# Patient Record
Sex: MALE | Race: WHITE | NOT HISPANIC OR LATINO | URBAN - METROPOLITAN AREA
[De-identification: names, ages, dates, MRNs, and addresses within clinical notes are randomized per-mention and may not be internally consistent; named-entity substitution may affect disease eponyms.]

---

## 2019-05-23 ENCOUNTER — OUTPATIENT (OUTPATIENT)
Dept: OUTPATIENT SERVICES | Facility: HOSPITAL | Age: 34
LOS: 1 days | End: 2019-05-23

## 2019-05-23 VITALS
HEIGHT: 65.75 IN | WEIGHT: 194.01 LBS | DIASTOLIC BLOOD PRESSURE: 80 MMHG | SYSTOLIC BLOOD PRESSURE: 116 MMHG | HEART RATE: 76 BPM | RESPIRATION RATE: 16 BRPM | TEMPERATURE: 98 F

## 2019-05-23 DIAGNOSIS — D17.22 BENIGN LIPOMATOUS NEOPLASM OF SKIN AND SUBCUTANEOUS TISSUE OF LEFT ARM: ICD-10-CM

## 2019-05-23 DIAGNOSIS — D17.9 BENIGN LIPOMATOUS NEOPLASM, UNSPECIFIED: ICD-10-CM

## 2019-05-23 LAB
HCT VFR BLD CALC: 48.7 % — SIGNIFICANT CHANGE UP (ref 39–50)
HGB BLD-MCNC: 16.6 G/DL — SIGNIFICANT CHANGE UP (ref 13–17)
MCHC RBC-ENTMCNC: 27.7 PG — SIGNIFICANT CHANGE UP (ref 27–34)
MCHC RBC-ENTMCNC: 34.1 % — SIGNIFICANT CHANGE UP (ref 32–36)
MCV RBC AUTO: 81.2 FL — SIGNIFICANT CHANGE UP (ref 80–100)
NRBC # FLD: 0 K/UL — SIGNIFICANT CHANGE UP (ref 0–0)
PLATELET # BLD AUTO: 178 K/UL — SIGNIFICANT CHANGE UP (ref 150–400)
PMV BLD: 12.4 FL — SIGNIFICANT CHANGE UP (ref 7–13)
RBC # BLD: 6 M/UL — HIGH (ref 4.2–5.8)
RBC # FLD: 12 % — SIGNIFICANT CHANGE UP (ref 10.3–14.5)
WBC # BLD: 5.25 K/UL — SIGNIFICANT CHANGE UP (ref 3.8–10.5)
WBC # FLD AUTO: 5.25 K/UL — SIGNIFICANT CHANGE UP (ref 3.8–10.5)

## 2019-05-23 RX ORDER — SODIUM CHLORIDE 9 MG/ML
1000 INJECTION, SOLUTION INTRAVENOUS
Refills: 0 | Status: DISCONTINUED | OUTPATIENT
Start: 2019-05-29 | End: 2019-06-13

## 2019-05-23 NOTE — H&P PST ADULT - SKIN
Addended by: LIVIA VANN on: 3/13/2018 02:34 PM     Modules accepted: Orders     detailed exam left shoulder mass

## 2019-05-23 NOTE — H&P PST ADULT - NSICDXPROBLEM_GEN_ALL_CORE_FT
PROBLEM DIAGNOSES  Problem: Lipoma  Assessment and Plan: Pt. is scheduled for excision lipoma left shoulder 5/29/19.

## 2019-05-29 ENCOUNTER — OUTPATIENT (OUTPATIENT)
Dept: OUTPATIENT SERVICES | Facility: HOSPITAL | Age: 34
LOS: 1 days | Discharge: HOME | End: 2019-05-29
Payer: MEDICAID

## 2019-05-29 VITALS
RESPIRATION RATE: 18 BRPM | WEIGHT: 194.01 LBS | TEMPERATURE: 98 F | DIASTOLIC BLOOD PRESSURE: 83 MMHG | HEIGHT: 65.75 IN | SYSTOLIC BLOOD PRESSURE: 125 MMHG | OXYGEN SATURATION: 98 % | HEART RATE: 78 BPM

## 2019-05-29 VITALS
HEART RATE: 78 BPM | RESPIRATION RATE: 15 BRPM | SYSTOLIC BLOOD PRESSURE: 120 MMHG | OXYGEN SATURATION: 98 % | DIASTOLIC BLOOD PRESSURE: 79 MMHG

## 2019-05-29 DIAGNOSIS — D17.22 BENIGN LIPOMATOUS NEOPLASM OF SKIN AND SUBCUTANEOUS TISSUE OF LEFT ARM: ICD-10-CM

## 2019-05-29 PROCEDURE — 88304 TISSUE EXAM BY PATHOLOGIST: CPT | Mod: 26

## 2019-05-29 RX ADMIN — SODIUM CHLORIDE 30 MILLILITER(S): 9 INJECTION, SOLUTION INTRAVENOUS at 16:25

## 2019-05-29 NOTE — ASU DISCHARGE PLAN (ADULT/PEDIATRIC) - CALL YOUR DOCTOR IF YOU HAVE ANY OF THE FOLLOWING:
Swelling that gets worse/Numbness, tingling, color or temperature change to extremity/Bleeding that does not stop/Pain not relieved by Medications

## 2019-05-29 NOTE — ASU DISCHARGE PLAN (ADULT/PEDIATRIC) - ASU DC SPECIAL INSTRUCTIONSFT
Please follow up with Dr. Oliver next week. Please call the office to confirm your appointment    Please apply ice pack to left shoulder as needed    You may move your arm.    You may shower but let the water run over the dressing, do not apply direct pressure or water to the shoulder

## 2019-05-29 NOTE — ASU DISCHARGE PLAN (ADULT/PEDIATRIC) - CARE PROVIDER_API CALL
Angi Oliver)  Surgery  [address] [place] [address]  Phone: [phone]  Fax: [phone]  Follow Up Time:

## 2019-06-12 LAB — SURGICAL PATHOLOGY STUDY: SIGNIFICANT CHANGE UP

## 2022-10-28 NOTE — ASU PATIENT PROFILE, ADULT - VISION (WITH CORRECTIVE LENSES IF THE PATIENT USUALLY WEARS THEM):
Risk Factors for Heart Disease   Heart disease includes coronary artery disease which involves damage to the heart arteries. It also includes congestive heart failure and other heart issues. The coronary arteries provide the oxygen your heart needs to pump blood to the rest of your body.   A risk factor is something that increases your chance of having a disease. Risk factors such as smoking or high cholesterol levels can damage arteries. You can’t control some heart risk factors. These include your age or having a family history of heart disease. But there are many heart risk factors you can control. This can reduce your risk for heart disease.   Unhealthy cholesterol levels   Cholesterol is a fat-like substance in your blood. It can build up along the artery walls. This is called plaque. Over time, plaque narrows the arteries. This reduces blood flow to your heart or brain. If a blood clot forms or a piece of plaque breaks off, it can block the artery. This can cause a heart attack or stroke. Your risk of heart disease goes up if you have high levels of LDL (\"bad\") cholesterol. Or if you have high levels of triglycerides. This is another fatty substance that can build up. You’re also at risk if you have low HDL (\"good\") cholesterol. HDL helps clear the bad cholesterol away. You're at risk if you have any of these:    · HDL cholesterol of 50 mg/dL or lower  · LDL cholesterol of 100 mg/dL or higher  · Triglycerides of 150 mg/dL or higher  Unhealthy diet  Diets high in saturated fats, trans fats, and cholesterol have been linked to heart disease and coronary artery disease. By cutting back on saturated fat and trans fat, you can lower your LDL (\"bad\") cholesterol and triglyceride levels. LDL is one of the main substances that causes heart attacks. Stay away from most trans fatty acids by eating less of these foods:   · Margarine  · Cookies  · Crackers  · French fries  · Doughnuts  · Other snack foods that have  partially hydrogenated oils    Replace less healthy foods by eating a diet with a lot of:   · Fruits  · Legumes  · Vegetables  · Whole grains  · Nuts  · Lean fish or lean animal protein    Drinking too much alcohol also raises the risk for heart disease. It can raise blood pressure levels. And it raises triglyceride levels.   Smoking  This is the most important risk factor you can change. You’re at risk if you use any kind of tobacco or nicotine. This includes:   · Cigarettes  · E-cigarettes  · Chew tobacco  · Cigars  · Pipe    If you smoke, it's never too late to help your heart. Ask your healthcare provider about nicotine replacement products and smoking cessation support. Quitting smoking is the single biggest way to reduce your risk of coronary artery disease.   High blood pressure   High blood pressure occurs when blood pushes too hard against artery walls. This damages the artery walls. Scar tissue forms as it heals. This makes the arteries stiff and weak. Plaque sticks to the scarred tissue. This narrows and hardens the arteries. High blood pressure also makes your heart work harder to get blood out to the body. It raises your risk of heart attack and especially stroke. The brain tissue is very sensitive to high blood pressure damage. You're at risk if your blood pressure is 120/80 or higher. Experts now label blood pressure between 130 to 139/80 to 89 as stage 1 hypertension.   Chronic kidney disease  Chronic kidney disease is another cardiac risk factor. Talk with your healthcare provider about ways to control kidney disease if you have it.   Negative emotions   Chronic stress, pent-up anger, and other negative emotions have been linked to heart disease. This is because stress increases the levels of a hormone that increase the demand on your heart. Over time, these emotions could raise your heart disease risk.   Metabolic syndrome   This is caused by a mix of certain risk factors. It puts you at extra high  risk of heart disease, stroke, and diabetes. You have metabolic syndrome if you have 3 or more of these:   · Low HDL cholesterol  · High triglycerides  · High blood pressure  · High blood sugar  · Extra weight around the waist    Diabetes  Diabetes occurs when you have high levels of sugar (glucose) in your blood. This can damage arteries if not kept under control. Having diabetes also makes you more likely to have a silent heart attack--one without any symptoms.   The A1C test is a common blood test that diagnoses type 1 and type 2 diabetes. It can also check how well you are managing diabetes. If your A1C level is between 5.7 and 6.4, you have prediabetes. Once your A1C reaches 6.5, you have diabetes. Your healthcare provider will help you figure out what your A1C should be. Your target number will depend on your age, general health, and other factors. Your treatment plan may need changes if your current number is too high.   Extra weight   Extra weight makes other risk factors, such as diabetes, more likely. Extra weight around the waist or stomach increases your heart disease risk the most.   You're at risk if your:   · Waist measures more than 35 inches (women) or 40 inches (men)  · Body mass index (BMI) is higher than 25.    Lack of physical activity   You're at risk if you exercise less than 40 minutes per day, on fewer than 3 to 4 days a week.   When you’re not active, you’re more likely to develop diabetes, high blood pressure, high cholesterol levels, and extra weight.   Most people with heart disease have more than one risk factor. As your number of risk factors increases, so does your risk for heart disease and coronary artery disease. Talk with your healthcare provider about your heart risk factors. Find out how you can improve them or remove them completely.   [employer] last reviewed this educational content on 7/1/2019  © 6532-1935 [email or web address]. [address] [place] [address], [place] [address].  All rights reserved. This information is not intended as a substitute for professional medical care. Always follow your healthcare professional's instructions.         Normal vision: sees adequately in most situations; can see medication labels, newsprint

## 2024-06-06 NOTE — H&P PST ADULT - NSANTHAGERD_ENT_A_CORE
Render Post-Care In The Note: No Cooling: DCD setting Were Eye Shields Employed?: Yes Spot Size: 18 mm Detail Level: Zone Total Pulses: 1 pass Pre-Procedure: Prior to proceeding the treatment areas were cleaned and all present put on their eye protection. Treatment Number: 0 Treatment Number: 10 Fluence (Will Not Render If 0): 12 Post-Procedure Care: Immediate endpoint: perifollicular erythema and edema. Post care reviewed with patient. Eye Shield Text: Given the treatment area eye shields were inserted prior to treatment. Pulse Duration (Include Units): 3 ms Shaving (Optional): The patient shaved at home Consent: Written consent obtained, risks reviewed including but not limited to crusting, scabbing, blistering, scarring, darker or lighter pigmentary change, paradoxical hair regrowth, incomplete removal of hair and infection. Fluence (Will Not Render If 0): 20 Cooling: DCD 40/20 Fluence (Will Not Render If 0): 16 Laser Type: Nd:Yag 1064nm Tolerated Procedure (Optional): Tolerated Well Post-Care Instructions: I reviewed with the patient in detail post-care instructions. Patient should avoid sun for a minimum of 4 weeks before and after treatment. Price (Use Numbers Only, No Special Characters Or $): 809 Anesthesia Type: 1% lidocaine with epinephrine Location Override: mons pubis Pulse Duration (Include Units): 10 ms No